# Patient Record
Sex: FEMALE | Race: WHITE | URBAN - METROPOLITAN AREA
[De-identification: names, ages, dates, MRNs, and addresses within clinical notes are randomized per-mention and may not be internally consistent; named-entity substitution may affect disease eponyms.]

---

## 2022-09-08 ENCOUNTER — OFFICE VISIT (OUTPATIENT)
Dept: FAMILY MEDICINE CLINIC | Facility: CLINIC | Age: 42
End: 2022-09-08

## 2022-09-08 DIAGNOSIS — Z11.1 ENCOUNTER FOR PPD TEST: Primary | ICD-10-CM

## 2022-09-08 PROCEDURE — 86580 TB INTRADERMAL TEST: CPT | Performed by: PHYSICIAN ASSISTANT

## 2022-09-08 NOTE — PATIENT INSTRUCTIONS
You will need to return to clinic in 48-72 hours to have results of TB test read. Please return to clinic on Sunday 9/11/22 before 4:30  to have your TB test read. If you do not return during this time your test will need to be repeated. Our clinic hours are:  Monday-Friday        8:00 am to 7:30 pm  Saturday/Sunday    9:00 am to 4:30 pm    You can go to any of the [place] [id number] to have your TB test read. To find your nearest [facility] go to [email or web address]

## 2022-09-08 NOTE — PROGRESS NOTES
Seen by Kandace Stone. Patient presented for PPD placement. She needs it for [employer] school job. She needs a physical too but does not have time today and only wants the tb testing. Tb questionnaire was completed and benign. PPD was administered without complications. She will follow up in 48-72 hrs for ppd read. She will do her physical on a subsequent date.

## 2022-09-11 ENCOUNTER — OFFICE VISIT (OUTPATIENT)
Dept: FAMILY MEDICINE CLINIC | Facility: CLINIC | Age: 42
End: 2022-09-11

## 2022-09-11 DIAGNOSIS — Z11.1 ENCOUNTER FOR PPD SKIN TEST READING: Primary | ICD-10-CM

## 2022-09-11 LAB — INDURATION (): 0 MM (ref 0–11)
